# Patient Record
Sex: FEMALE | Race: WHITE
[De-identification: names, ages, dates, MRNs, and addresses within clinical notes are randomized per-mention and may not be internally consistent; named-entity substitution may affect disease eponyms.]

---

## 2018-02-08 ENCOUNTER — HOSPITAL ENCOUNTER (EMERGENCY)
Dept: HOSPITAL 62 - ER | Age: 33
Discharge: LEFT BEFORE DISCHARGE | End: 2018-02-08
Payer: COMMERCIAL

## 2018-02-08 VITALS — SYSTOLIC BLOOD PRESSURE: 111 MMHG | DIASTOLIC BLOOD PRESSURE: 80 MMHG

## 2018-02-08 DIAGNOSIS — Z53.21: Primary | ICD-10-CM

## 2018-02-08 DIAGNOSIS — R09.81: ICD-10-CM

## 2018-02-13 LAB
ERYTHROCYTE [DISTWIDTH] IN BLOOD BY AUTOMATED COUNT: 12.6 % (ref 11.5–14)
HCT VFR BLD CALC: 40.7 % (ref 36–47)
HGB BLD-MCNC: 13.9 G/DL (ref 12–15.5)
MCH RBC QN AUTO: 31.8 PG (ref 27–33.4)
MCHC RBC AUTO-ENTMCNC: 34.2 G/DL (ref 32–36)
MCV RBC AUTO: 93 FL (ref 80–97)
PLATELET # BLD: 342 10^3/UL (ref 150–450)
RBC # BLD AUTO: 4.37 10^6/UL (ref 3.72–5.28)
WBC # BLD AUTO: 12.8 10^3/UL (ref 4–10.5)

## 2018-02-14 ENCOUNTER — HOSPITAL ENCOUNTER (OUTPATIENT)
Dept: HOSPITAL 62 - OROUT | Age: 33
Discharge: HOME | End: 2018-02-14
Attending: SURGERY
Payer: COMMERCIAL

## 2018-02-14 VITALS — DIASTOLIC BLOOD PRESSURE: 60 MMHG | SYSTOLIC BLOOD PRESSURE: 98 MMHG

## 2018-02-14 DIAGNOSIS — F41.9: ICD-10-CM

## 2018-02-14 DIAGNOSIS — Z79.899: ICD-10-CM

## 2018-02-14 DIAGNOSIS — Z90.710: ICD-10-CM

## 2018-02-14 DIAGNOSIS — K80.10: Primary | ICD-10-CM

## 2018-02-14 DIAGNOSIS — F17.200: ICD-10-CM

## 2018-02-14 PROCEDURE — 0FT44ZZ RESECTION OF GALLBLADDER, PERCUTANEOUS ENDOSCOPIC APPROACH: ICD-10-PCS | Performed by: SURGERY

## 2018-02-14 PROCEDURE — 36415 COLL VENOUS BLD VENIPUNCTURE: CPT

## 2018-02-14 PROCEDURE — 85027 COMPLETE CBC AUTOMATED: CPT

## 2018-02-14 PROCEDURE — 88304 TISSUE EXAM BY PATHOLOGIST: CPT

## 2018-02-14 PROCEDURE — 47562 LAPAROSCOPIC CHOLECYSTECTOMY: CPT

## 2018-02-14 RX ADMIN — DIPHENHYDRAMINE HYDROCHLORIDE ONE MG: 50 INJECTION INTRAMUSCULAR; INTRAVENOUS at 15:11

## 2018-02-14 RX ADMIN — DIPHENHYDRAMINE HYDROCHLORIDE ONE MG: 50 INJECTION INTRAMUSCULAR; INTRAVENOUS at 15:04

## 2018-02-14 NOTE — OPERATIVE REPORT
Operative Report


DATE OF SURGERY: 02/14/18


PREOPERATIVE DIAGNOSIS: Symptomatic cholelithiasis with cholecystitis


POSTOPERATIVE DIAGNOSIS: Same


OPERATION: Laparoscopic cholecystectomy


SURGEON: BELINDA POSADA ASSISTANT: CHARLIE MCCLELLAN


ANESTHESIA: GA


TISSUE REMOVED OR ALTERED: 1 gallbladder with contents


COMPLICATIONS: 











None


ESTIMATED BLOOD LOSS: Scant


INTRAOPERATIVE FINDINGS: See below


PROCEDURE: 





After obtaining informed consent, the patient was taken to the operating room.  

General  Anesthesia was induced; the arms were extended, and the abdomen was 

exposed, and prepped and draped in a sterile fashion.  Instrumentation was set 

up for laparoscopic cholecystectomy.


 


Surgical plan and surgical timeout were conducted.


 


A vertical incision was made above the umbilicus, and a verres needle was 

inserted uneventfully into the peritoneal cavity.  Pneumoperitoneum was 

established.


 


The verres needle was removed and a 5 mm trocar was inserted and a 5 mm 

flexible laparoscope was inserted.  Visualization of the peritoneal cavity 

confirmed safe uneventful entry.  Under direct visualization 3 additional 5 mm 

ports were established, one in the subxiphoid position and second in the 

subcostal position.  Visualization of the hepatobiliary anatomy revealed no 

anatomic variations.  A grasper was placed on the fundus of the gallbladder and 

the gallbladder is elevated over the right surface of the liver; a second 

grasper was used to grasp the infundibulum of the gallbladder.





There were fairly dense but filmy adhesions between the gallbladder infundibulum

, and the second portion of the duodenum.  These were taken down using a 

combination of blunt and careful electrocautery dissection.  The neck of the 

gallbladder and junction with the cystic duct was dissected out.  The Cystic 

artery was in its usual location medial and cephalad to the cystic duct.  The 

cystic artery was surrounded with a right angle clamp, clipped twice proximally 

and divided with laparoscopic scissors.


 


We now opened the triangle of Calot by dividing the peritoneal reflection on 

both the medial and lateral sides of the cystic duct infundibular junction.  

The critical view was obtained.  We now milked the cystic duct of any possible 

stones, clipped the cystic duct approximately 2 times once distally and divided 

with scissors.


 


The gallbladder was now removed from the undersurface of the liver using hook 

cautery dissection.  Graspers were repositioned and the gallbladder was removed 

uneventfully from the abdominal cavity through the super umbilical port site 

incision.  The specimen was examined, then passed off to pathology for 

permanent analysis.


 


We returned to the peritoneal cavity check for bleeding, and evidence of bile 

leak, and there was none.  We  Confirmed satisfactory placement of clips on 

cystic duct and cystic artery were secured .  At this point we felt  the 

operation was complete.  The subcutaneous tissue was then anesthetized  with 

quarter percent Marcaine Sponge and needle counts are correct.  All ports 

removed under direct visualization pneumoperitoneum evacuated, and 5 mm port 

wounds closed with 3-0 Vicryl suture, benzoin and Steri-Strips.


 


The patient was extubated, and taken to the recovery room in stable condition.





The physician assistant, Ms. Cueto, provided assistance during this case by: 

Assisting and port insertion, retracting tissue, instillation of local 

anesthesia and closure of skin incisions.

## 2018-02-14 NOTE — PDOC DISCHARGE SUMMARY
Discharge Summary (SDC)





- Discharge


Final Diagnosis: 





cholelithiasis


Date of Surgery: 02/14/18


Discharge Date: 02/14/18


Condition: Stable


Treatment or Instructions: 








 [facility]


 [address], [place] [address]


 Phone: [phone]    Fax:  [phone]


 





 Discharge Instructions: Laparoscopic Surgery





1. General Information: 


a.   DO NOT DRIVE a car or operate dangerous machinery for 3-4 days or while 

taking narcotic pain pills.


b.   DO NOT consume alcohol, tranquilizers, sleeping medications or any non-

prescribed medications for 24 hours unless approved by your doctor or as long 

as taking narcotic prescription medications.


c.   DO NOT make important decisions or sign any important papers for the first 

24 hours after surgery.


d.   When discharged home the same day of surgery have a responsible person 

with you for the first night.


2. Activity Restrictions: __________ 2 weeks 


a.   NO heavy lifting, straining abdominal muscles, bending over a lot, yard 

work, house work, or sports for 2 weeks.


b.   DO NOT drive for 3-4 days


c.   It is fine to go for walks, up and down steps, ride in a car. 


d.   Elevate your head when sleeping/resting.


3. Treatment: 


a.   You may shower 24 hours after surgery, no baths or swimming for 2 weeks. 

Remove band-aids or dressings before shower but leave paper strips (steri-strips

) on the skin to fall off on their own. If still on at postoperative visit they 

will be removed then.  


b.   Drainage of fluid or blood is not unusual from an incision. If occurs, you 

can clean with peroxide and cotton ball daily and cover with dry gauze until 

the wound seals. 


c.   If a lot of bleeding occurs, you can hold pressure with a gauze or cloth 

over the site for 10 minutes and it will usually stop. If bleeding continues 

you will need to call for possible evaluation in office or emergency room.


4. Medications: 


a.   __Toradol_ may be taken for pain as needed, one tablet every 6 hours. 


b.   You should resume all normal medications unless a change is specified by 

your doctors.





5. Diet: 


                _____ Begin with clear liquids and may progress to your normal 

diet if not nauseated. No high fat, high protein foods 


                         the day of surgery. 


   


6. The following may occur after laparoscopic surgery:


a.   Shoulder or upper back ache from retained gas that should resolve in 1-2 

days


b.   Soreness and bruising at incision sites will resolve with time.


c.   Scrotal swelling (labia in women) and bruising is often seen after hernia 

surgery.


d.   Sore throat


e.   Fatigue may last days to weeks.


f.   Difficulty urinating may occur and may need to come into emergency room 

for urinary catheter placement.


7. Notify Physician If:


a.   Worsening or pain not improved with pain medication


b.   Persistent nausea and vomiting


c.   Fever above 101


d.   Persistent bleeding or swelling at operative site


e.   Unable to urinate and uncomfortable bladder 6-8 hours after surgery


8..Follow Up Care:


a.   Schedule a follow up appointment with your doctor for 2 weeks. In the 

event of any postoperative problems or questions or you may call the office 

during business hours or the On-Call physician evenings and weekends at [facility].   [facility] [phone]     [facility] [phone]





   I understand the instructions for my postoperative care as described above 

and a copy has been given to me.





   _________________________          _________________________          _______

________


   Patient/Significant Other                    Witness                        

                       Date


Prescriptions: 


Ketorolac Tromethamine [Toradol 10 mg Tablet] 10 mg PO Q6HP PRN #25 tablet


 PRN Reason: 


Referrals: 


CHU LAGOS PA-C [Primary Care Provider] - 


Discharge Diet: Clear Liquids, Full Liquids - Progress to small bland meals


Discharge Activity: Activity As Tolerated


Report the Following to Your Physician Immediately: Nausea, Vomiting, Increase 

in Pain, Fever over 101 Degrees

## 2018-12-20 ENCOUNTER — HOSPITAL ENCOUNTER (OUTPATIENT)
Dept: HOSPITAL 62 - RAD | Age: 33
End: 2018-12-20
Payer: COMMERCIAL

## 2018-12-20 DIAGNOSIS — Z98.890: ICD-10-CM

## 2018-12-20 DIAGNOSIS — M54.5: Primary | ICD-10-CM

## 2018-12-20 DIAGNOSIS — M54.17: ICD-10-CM

## 2018-12-20 PROCEDURE — A9576 INJ PROHANCE MULTIPACK: HCPCS

## 2018-12-20 PROCEDURE — 72158 MRI LUMBAR SPINE W/O & W/DYE: CPT

## 2018-12-20 NOTE — RADIOLOGY REPORT (SQ)
EXAM DESCRIPTION:  MRI LUMBAR SPINE COMBO



COMPLETED DATE/TIME:  12/20/2018 12:39 pm



REASON FOR STUDY:  M54.5 LOW BACK PAIN/M54.17 RADICULOPATHY, LUMBOSACRAL REGION Z98.890  OTHER SPECIF
IED POSTPROCEDURAL STATES M54.5  LOW BACK PAIN M54.17  RADICULOPATHY, LUMBOSACRAL REGION



COMPARISON:  CT abdomen pelvis 6/9/2017

MRI lumbar spine 1/31/2018, 1/6/2016



TECHNIQUE:  Sagittal and Axial imaging includes T1, T1 post gadolinium, T2, STIR and gradient echo se
quences. Coronal T2/HASTE imaging.



CONTRAST TYPE AND DOSE:  15 mL Dotarem.



RENAL FUNCTION:  None required. The patient is less than 50 years old.



LIMITATIONS:  None.



FINDINGS:  VISUALIZED UPPER ABDOMEN:  Limited evaluation. No acute or suspicious findings suggested.

SEGMENTATION: No transitional anatomy. The lowest well-developed disc space is labeled L5-S1.

ALIGNMENT: Anatomic.

VERTEBRAE: Intact.  No fractures.

BONE MARROW: There are edematous vertebral body endplate changes at the L5-S1 level

DISC SIGNAL: Decreased T2 weighted intervertebral disc signal with disc space loss of height at L5-S1


POSTERIOR ELEMENTS:  Old right laminectomy at L5

HARDWARE: None in the spine.

CORD AND CONUS: Normal in size and signal intensity. Conus at the L1-2 level.

SOFT TISSUES: No aortic aneurysm seen. No bulky retroperitoneal adenopathy or mass. No paraspinal mas
s or fluid.

T12-L1:  Unremarkable

L1-L2: Mild bilateral facet arthropathy.  No central or foraminal stenosis

L2-L3: Mild bilateral facet arthropathy.  No central or foraminal stenosis

L3-L4: Moderate bilateral facet hypertrophy.  Mild diffuse posterior disc bulging with more focal lef
t lateral and foraminal protrusion.  This causes mild inferior left foraminal narrowing without exiti
ng left L3 nerve root impingement.

Elsewhere at L3-4, there is no central or right foraminal narrowing.

L4-L5: Mild diffuse posterior disc bulging and moderate bilateral facet and ligament hypertrophy.  No
 central stenosis.  Mild bilateral inferior foraminal narrowing.

L5-S1: Old right laminectomy at L5-S1.  There is a recurrent disc herniation, with a rim of periphera
l contrast enhancement around the extruded fragment in the right paracentral region.  This measures a
bout 6 to 7 mm in size and flattens the thecal sac at the takeoff of the right proximal S1 nerve root
 in the lateral recess.  This tiny extruded disc fragment is best shown on axial T1 postcontrast imag
e 28, and sagittal T1 postcontrast image 7.

Elsewhere at L5-S1, there is no central stenosis.  Moderate right foraminal narrowing from facet arth
ropathy and right-sided disc bulging is present, with partial effacement of the fat around the exitin
g right L5 nerve root on sagittal image 4.  Mild inferior left foraminal narrowing without exiting le
ft L5 nerve root impingement.

SACRUM: Visualized upper sacrum intact.

ENHANCEMENT: No abnormal conus or lumbar nerve root enhancement

OTHER: No other significant findings.



IMPRESSION:  Suspect a small 7 mm extruded fragment along the right paracentral disc margin at L5-S1.
  This displaces the right proximal S1 nerve root dorsally in the lateral recess.



TECHNICAL DOCUMENTATION:  JOB ID:  [id number]

 2011 [employer]- All Rights Reserved



Reading location - IP/workstation name: [email or web address]

## 2019-03-30 ENCOUNTER — HOSPITAL ENCOUNTER (OUTPATIENT)
Dept: HOSPITAL 62 - RAD | Age: 34
End: 2019-03-30
Payer: COMMERCIAL

## 2019-03-30 DIAGNOSIS — Z98.1: Primary | ICD-10-CM

## 2019-03-30 PROCEDURE — 72158 MRI LUMBAR SPINE W/O & W/DYE: CPT

## 2019-03-30 PROCEDURE — A9576 INJ PROHANCE MULTIPACK: HCPCS

## 2019-03-30 NOTE — RADIOLOGY REPORT (SQ)
EXAM DESCRIPTION:  MRI LUMBAR SPINE COMBO



COMPLETED DATE/TIME:  3/30/2019 12:51 pm



REASON FOR STUDY:  LOW BACK PAIN Z98.1  ARTHRODESIS STATUS



COMPARISON:  MRI lumbar spine 12/20/2018, 1/31/2018, 1/6/2016

CT abdomen pelvis 6/9/2017



TECHNIQUE:  Sagittal and Axial imaging includes T1, T1 post gadolinium, T2, STIR and gradient echo se
quences. Coronal T2/HASTE imaging.



CONTRAST TYPE AND DOSE:  15 mL Dotarem.



RENAL FUNCTION:  Not indicated.  ACR Type II contrast agent associated with few, if any, unconfounded
 cases of NSF



LIMITATIONS:  None.



FINDINGS:  VISUALIZED UPPER ABDOMEN:  Limited evaluation. No acute or suspicious findings suggested.

SEGMENTATION: No transitional anatomy. The lowest well-developed disc space is labeled L5-S1.

ALIGNMENT: Anatomic.

VERTEBRAE: Intact.  No fractures.

BONE MARROW: Normal. No marrow replacement or reactive changes.

DISC SIGNAL: Decreased T2 weighted intervertebral disc signal at L4-5 and L5-S1 without disc space lo
ss of height

POSTERIOR ELEMENTS:  Post right laminectomy at L5 with bilateral trans pedicular screws and dorsal fi
xation plates.  No central stenosis at this level.

HARDWARE: Fusion hardware at L4-5.  There is also a dorsal column stimulator entering the spinal chico
l at the T12-L1 level with electrodes coursing up through the upper edge of the field of view.

CORD AND CONUS: Normal in size and signal intensity. Conus at the L2 level.

SOFT TISSUES: No aortic aneurysm seen. No bulky retroperitoneal adenopathy or mass. No paraspinal mas
s or fluid.

L1-L2: No significant spinal stenosis or exit foraminal stenosis.

L2-L3: No significant spinal stenosis or exit foraminal stenosis.

L3-L4: Mild left foraminal and lateral disc bulging is present with mild bilateral facet and ligament
 hypertrophy.  This causes mild left foraminal narrowing without exit L3 nerve root impingement.  No 
central stenosis or right foraminal stenosis.

L4-L5: Minimal posterior disc bulge, mild bilateral facet and ligament hypertrophy.  No central canal
 or foraminal stenosis

L5-S1: Old right laminectomy.  Thecal sac widely decompressed.  No significant central or foraminal e
ncroachment.

LOWER THORACIC: Incompletely imaged.  No stenosis seen.

SACRUM: Visualized upper sacrum intact.

ENHANCEMENT: No abnormal conus or nerve root enhancement.  No significant contrast enhancement around
 the thecal sac at the L5-S1 level.

OTHER: No other significant findings.



IMPRESSION:  Old lower lumbar fusion.  No significant central or foraminal stenosis.  No abnormal enh
ancement post contrast



TECHNICAL DOCUMENTATION:  JOB ID:  [id number]

 2011 [employer]- All Rights Reserved



Reading location - IP/workstation name: [email or web address]